# Patient Record
Sex: MALE | NOT HISPANIC OR LATINO | URBAN - METROPOLITAN AREA
[De-identification: names, ages, dates, MRNs, and addresses within clinical notes are randomized per-mention and may not be internally consistent; named-entity substitution may affect disease eponyms.]

---

## 2024-05-23 ENCOUNTER — TELEMEDICINE (OUTPATIENT)
Dept: CARDIOLOGY | Facility: HOSPITAL | Age: 64
End: 2024-05-23

## 2024-05-23 DIAGNOSIS — I49.3 PVC (PREMATURE VENTRICULAR CONTRACTION): Primary | ICD-10-CM

## 2024-05-23 DIAGNOSIS — I47.29 NSVT (NONSUSTAINED VENTRICULAR TACHYCARDIA) (MULTI): ICD-10-CM

## 2024-05-23 PROBLEM — I10 ESSENTIAL HYPERTENSION: Status: ACTIVE | Noted: 2024-05-23

## 2024-05-23 PROCEDURE — 99204 OFFICE O/P NEW MOD 45 MIN: CPT | Performed by: INTERNAL MEDICINE

## 2024-05-23 RX ORDER — AMIODARONE HYDROCHLORIDE 200 MG/1
200 TABLET ORAL DAILY
COMMUNITY

## 2024-05-23 RX ORDER — AMLODIPINE AND VALSARTAN 10; 160 MG/1; MG/1
1 TABLET ORAL DAILY
COMMUNITY

## 2024-05-23 NOTE — PROGRESS NOTES
Current Outpatient Medications   Medication Instructions    amiodarone (PACERONE) 200 mg, oral, Daily    amlodipine-valsartan (Exforge)  mg tablet 1 tablet, oral, Daily      Subjective   Anders Cho is a 63 y.o. male.    Chief Complaint:  Ventricular tachycardia and PVC's    HPI    Patient presents for a virtual visit today with frequent PVC's and NSVT and previous RVOT ablation to discuss further treatment.     PMH includes: HTN, PVC's s/p RVOT ablation, h/o PE.    His symptoms include palpitations. He has previously been treated with sotalol and is currently managed with amiodarone.     Patient has had a history of non-sustained ventricular tachycardia since at least 2011. for an EPS and RVOT VT ablation (noted to be originating from the low RVOT) in [place], [place] in 2012, During a stay in [place], he underwent MRI and MRA scans, which showed normal coronary arteries, no signs of arrhythmogenic right ventricular dysplasia, and a normal left ventricular function with an ejection fraction of 61%. A Holter monitor test performed off Sotalol revealed no arrhythmias.  After returning to [facility], he experienced no palpitations for eight months, but then they resumed.   Subsequent Holter monitoring identified frequent PVC's and ventricular tachycardia, leading to a restart of Sotalol at 120 mg twice daily.     He was again seen by an EP in October 2022, during which time he had been free of palpitations and was not on antiarrhythmic medications. He had undergone two back surgeries recently and developed two pulmonary emboli, requiring treatment with Eliquis 5 mg twice daily. He had also started experiencing shortness of breath on exertion. An echocardiogram at that time showed normal biventricular size and function with no evidence of pulmonary hypertension.    Recently, he reported chest discomfort and palpitations, leading to a referral for further evaluation.   Echocardiography indicated normal biventricular  size and function, but a mildly elevated right ventricular systolic pressure of 42 mm Hg.   A 24-hour Holter revealed sinus rhythm with occasional premature atrial contractions, moderate numbers of premature ventricular contractions, a single ventricular couplet, and one short run of non-sustained ventricular tachycardia. Electrolyte levels and thyroid function tests returned normal results.  He was started on amiodarone with satisfactory results but he does not want to continue taking it long term due to its toxicity profile.  He is seeking a repeat catheter ablation for his PVCs and NSVTs.      TESTING    - 09/2023 24 Hour Holter:   CONCLUSIONS:  Sinus rhythm with occasional premature atrial contractions and occasional premature ventricular contractions with one ventricular couplet and one tripler and a single short run of ventricular tachycardia. There were no runs of supraventricular tachycardia, and no significant pauses were seen. The maximum heart rate was 170/minute at 19:03 HRS with a minimum heart rate of 57/minute at 04:46 HRS and an average heart rate of 72/minute.    07/2023 HOLTER:   CONCLUSIONS:  Sinus thythm with occasional premature atrial contractions and occasional premature ventricular contractions with one ventricular couplet and one triplet and a single short run of ventricular tachycardia. There were no runs of supraventricular tachycardia, and no signiticant pauses were seen. The meximum heart rate was 170/minute at 19:03 HRS with a minimum heart rate of 57/minute at 04:46 HRS and an average heart rate of 72/minute.    -ECHO 09/20/23 : LVEF 77%   Impression:  Mildly thickened non-coronary cusp of aortic valve with trivial aortic regurgitation.  Structurally normal mitral, pulmonary, and tricuspid valves with mild mitral, pulmonary and tricuspid regurgitation.  Normal biatrial size and function.  Normal biventricular size and function.  Mildly elevated pulmonary artery pressures.    -  "Treadmill Stress test 2012: \"Good exercise tolerance with no evidence for ischemia\"    - ECG 04/18/2011: Sinus perry at 53bpm, SC interval 88ms, otherwise normal      Review of Systems   Constitutional: Negative.   HENT: Negative.     Eyes: Negative.    Cardiovascular:  Positive for irregular heartbeat and palpitations.   Respiratory: Negative.     Endocrine: Negative.    Skin: Negative.    Musculoskeletal: Negative.    Gastrointestinal: Negative.    Genitourinary: Negative.    Neurological: Negative.    Psychiatric/Behavioral: Negative.         Assessment/Plan     WE DISCUSSED THE RISKS AND BENEFITS OF AADs vs. CATHETER ABLATION FOR PVCs / NSVT IN THE SETTINGS OF STRUCTURAL NORMAL HEART AND LV FUNCTION. PMHx OF CATHETER ABLATION FOR RVOT PVCs.   HE UNDERSTANDS AND WANTS TO PROCEED WITH EPS AND ABLATION.      SCHEDULE AF ABLATION UNDER CONSCIOUS SEDATION  HOLD ALL MEDS IN THE MORNING OF PROCEDURE  CARTO SYSTEM - STEREO LAB    PT WILL CHECK WITH HIS LOCAL CARDIOLOGIST IF THEY ARE ABLE TO OBTAIN A CARDIAC MRI IN \[facility]\"".  ** ONCE WE HAVE A DATE FOR THE ABLATION PT NEEDS TO DISCONTINUE AMIODARONE 3-4 WEEKS PRIOR THE PROCEDURE AND REPEAT A HOLTER IN \[facility]\"" TO DOCUMENT THE PVCs ARE STILL PRESENT.      MD Chon Mederos Master Clinician of Cardiovascular [place].   [facility] [place].   Director of Electrophysiology Center  Professor of Medicine.   [employer].   "

## 2024-05-28 ASSESSMENT — ENCOUNTER SYMPTOMS
ENDOCRINE NEGATIVE: 1
IRREGULAR HEARTBEAT: 1
PSYCHIATRIC NEGATIVE: 1
MUSCULOSKELETAL NEGATIVE: 1
PALPITATIONS: 1
GASTROINTESTINAL NEGATIVE: 1
CONSTITUTIONAL NEGATIVE: 1
RESPIRATORY NEGATIVE: 1
EYES NEGATIVE: 1
NEUROLOGICAL NEGATIVE: 1

## 2024-09-11 ENCOUNTER — PHARMACY VISIT (OUTPATIENT)
Dept: PHARMACY | Facility: CLINIC | Age: 64
End: 2024-09-11
Payer: COMMERCIAL

## 2024-09-11 ENCOUNTER — OFFICE VISIT (OUTPATIENT)
Dept: CARDIOLOGY | Facility: CLINIC | Age: 64
End: 2024-09-11
Payer: COMMERCIAL

## 2024-09-11 VITALS
HEIGHT: 73 IN | OXYGEN SATURATION: 98 % | DIASTOLIC BLOOD PRESSURE: 97 MMHG | SYSTOLIC BLOOD PRESSURE: 163 MMHG | BODY MASS INDEX: 29.65 KG/M2 | HEART RATE: 56 BPM | RESPIRATION RATE: 18 BRPM | WEIGHT: 223.7 LBS

## 2024-09-11 DIAGNOSIS — Z86.711 HISTORY OF PULMONARY EMBOLISM: ICD-10-CM

## 2024-09-11 DIAGNOSIS — I49.3 PVC (PREMATURE VENTRICULAR CONTRACTION): ICD-10-CM

## 2024-09-11 DIAGNOSIS — I47.29 NSVT (NONSUSTAINED VENTRICULAR TACHYCARDIA) (MULTI): ICD-10-CM

## 2024-09-11 DIAGNOSIS — I10 ESSENTIAL HYPERTENSION: ICD-10-CM

## 2024-09-11 DIAGNOSIS — Z01.818 PRE-OPERATIVE CLEARANCE: Primary | ICD-10-CM

## 2024-09-11 PROCEDURE — RXMED WILLOW AMBULATORY MEDICATION CHARGE

## 2024-09-11 PROCEDURE — 93005 ELECTROCARDIOGRAM TRACING: CPT | Performed by: INTERNAL MEDICINE

## 2024-09-11 PROCEDURE — 3080F DIAST BP >= 90 MM HG: CPT | Performed by: INTERNAL MEDICINE

## 2024-09-11 PROCEDURE — RXOTC WILLOW AMBULATORY OTC CHARGE

## 2024-09-11 PROCEDURE — 3008F BODY MASS INDEX DOCD: CPT | Performed by: INTERNAL MEDICINE

## 2024-09-11 PROCEDURE — 3077F SYST BP >= 140 MM HG: CPT | Performed by: INTERNAL MEDICINE

## 2024-09-11 PROCEDURE — 1126F AMNT PAIN NOTED NONE PRSNT: CPT | Performed by: INTERNAL MEDICINE

## 2024-09-11 PROCEDURE — 99214 OFFICE O/P EST MOD 30 MIN: CPT | Performed by: INTERNAL MEDICINE

## 2024-09-11 PROCEDURE — 1159F MED LIST DOCD IN RCRD: CPT | Performed by: INTERNAL MEDICINE

## 2024-09-11 RX ORDER — AMINOPHYLLINE 25 MG/ML
125 INJECTION, SOLUTION INTRAVENOUS ONCE AS NEEDED
Status: CANCELLED | OUTPATIENT
Start: 2024-09-11

## 2024-09-11 RX ORDER — SPIRONOLACTONE 25 MG/1
12.5 TABLET ORAL DAILY
Qty: 60 TABLET | Refills: 0 | Status: SHIPPED | OUTPATIENT
Start: 2024-09-11 | End: 2025-09-11

## 2024-09-11 RX ORDER — SPIRONOLACTONE 25 MG/1
12.5 TABLET ORAL DAILY
Qty: 60 TABLET | Refills: 0 | Status: SHIPPED | OUTPATIENT
Start: 2024-09-11 | End: 2024-09-11

## 2024-09-11 RX ORDER — REGADENOSON 0.08 MG/ML
0.4 INJECTION, SOLUTION INTRAVENOUS
Status: CANCELLED | OUTPATIENT
Start: 2024-09-11

## 2024-09-11 ASSESSMENT — COLUMBIA-SUICIDE SEVERITY RATING SCALE - C-SSRS
6. HAVE YOU EVER DONE ANYTHING, STARTED TO DO ANYTHING, OR PREPARED TO DO ANYTHING TO END YOUR LIFE?: NO
2. HAVE YOU ACTUALLY HAD ANY THOUGHTS OF KILLING YOURSELF?: NO
1. IN THE PAST MONTH, HAVE YOU WISHED YOU WERE DEAD OR WISHED YOU COULD GO TO SLEEP AND NOT WAKE UP?: NO

## 2024-09-11 ASSESSMENT — PATIENT HEALTH QUESTIONNAIRE - PHQ9
2. FEELING DOWN, DEPRESSED OR HOPELESS: NOT AT ALL
1. LITTLE INTEREST OR PLEASURE IN DOING THINGS: NOT AT ALL
SUM OF ALL RESPONSES TO PHQ9 QUESTIONS 1 AND 2: 0

## 2024-09-11 ASSESSMENT — ENCOUNTER SYMPTOMS
DEPRESSION: 0
OCCASIONAL FEELINGS OF UNSTEADINESS: 0
LOSS OF SENSATION IN FEET: 0

## 2024-09-11 ASSESSMENT — PAIN SCALES - GENERAL: PAINLEVEL: 0-NO PAIN

## 2024-09-11 NOTE — PROGRESS NOTES
Subjective   Anders Cho is a 64 y.o. male.    Past medical history  Hypertension is on amlodipine/valsartan combination  Pulmonary embolism  RVOT ablation due to PVC possibly in 2011 at [place]  Patient had back surgeries in the past and connection to that developed pulmonary embolism.  Was on a Eliquis therapy follow-up.    Social history: Patient lives in [facility].  Currently traveling here for medical treatment    Chief Complaint:  No chief complaint on file.  Patient has been seen in the past by Dr. Messer who recommended getting a cardiac MRI.  Patient saw his electrophysiologist mainly because of symptomatic PVC and a nonsustained VT.  Patient is currently on amiodarone and did not want to continue that because of the toxicity risk on long-term use.  Patient reach out to Dr. Soares for further treatment, possible ablation who advised him to get a cardiac MRI.  Patient is originally from  [facility] and was advised to get cardiac MRI locally.  In the past he had MRI prior to ablation in 2011 and recently had an echo somewhere and apparently both were normal.  We do not have any records in the system.      HPI  Getting a cervical decompression surgery on Monday next week.  Is here for preop clearance    Denies all cardiovascular complaints like palpitation, shortness of breath, lower extremity edema or chest pain    Physically not very active      ROS  No major symptoms    Objective   Constitutional:       Appearance: Not in distress.   Neck:      Vascular: JVD normal.   Pulmonary:      Effort: Pulmonary effort is normal.      Breath sounds: Normal breath sounds.   Cardiovascular:      PMI at left midclavicular line. Normal rate. Regular rhythm. Normal S1. Normal S2.       Murmurs: There is no murmur.   Abdominal:      General: Bowel sounds are normal.      Palpations: Abdomen is soft.   Skin:     General: Skin is warm and dry.   Neurological:      General: No focal deficit present.      Mental Status: Alert  "and oriented to person, place and time.       EKG: Sinus rhythm, otherwise normal      Lab Review:   No results found for: \"NA\", \"K\", \"CL\", \"CO2\", \"BUN\", \"CREATININE\", \"GLUCOSE\", \"CALCIUM\"  No results found for: \"WBC\", \"HGB\", \"HCT\", \"MCV\", \"PLT\"  No results found for: \"CHOL\", \"TRIG\", \"HDL\", \"LDLDIRECT\"    Assessment/Plan   The encounter diagnosis was Pre-operative clearance.  Known case of hypertension and a prior ablation for PVCs.  Patient also has history of pulmonary embolism in the past associated with back surgeries.  Patient is currently getting a cervical decompression surgery on Monday next week and is here for preop clearance  Echocardiogram and stress test is already ordered and pending  Denies all cardiovascular symptoms but physically not very active.  Based on history do not achieve 5 METS    Preop clearance:  -No alarming symptoms  -Compliant with his medication  -Blood pressure is high and optimizing management  -Pending echo and stress test      PVC and nonsustained VT:  Currently asymptomatic  -Compliant with amiodarone  -Follows up with EP       Hypertension:  On amlodipine and valsartan combination  -Blood pressure is in 160s  -Drop in the blood pressure at home but mentions that during the last visit blood pressure was high  -Advised to take 12.5 mg spironolactone  -Had a blood work done in his home country which shows normal creatinine and potassium  -Patient advised to measure his blood pressure regularly at home as it may defer to surgery if his blood pressure is too high      Most likely no future follow-ups as patient does not live here  Patient is advised to reach out to me for paperwork for clearance    Addendum for preop clearance  Normal Stress Test and Echo, low risk for surgery  "

## 2024-09-12 ENCOUNTER — HOSPITAL ENCOUNTER (OUTPATIENT)
Dept: CARDIOLOGY | Facility: CLINIC | Age: 64
Discharge: HOME | End: 2024-09-12
Payer: COMMERCIAL

## 2024-09-12 VITALS
WEIGHT: 223 LBS | BODY MASS INDEX: 29.55 KG/M2 | HEIGHT: 73 IN | SYSTOLIC BLOOD PRESSURE: 150 MMHG | DIASTOLIC BLOOD PRESSURE: 82 MMHG

## 2024-09-12 DIAGNOSIS — Z86.711 HISTORY OF PULMONARY EMBOLISM: ICD-10-CM

## 2024-09-12 DIAGNOSIS — I47.29 NSVT (NONSUSTAINED VENTRICULAR TACHYCARDIA) (MULTI): ICD-10-CM

## 2024-09-12 DIAGNOSIS — Z01.818 ENCOUNTER FOR OTHER PREPROCEDURAL EXAMINATION: ICD-10-CM

## 2024-09-12 LAB
AORTIC VALVE PEAK VELOCITY: 1.58 M/S
ATRIAL RATE: 56 BPM
AV PEAK GRADIENT: 10 MMHG
AVA (PEAK VEL): 3.42 CM2
EJECTION FRACTION APICAL 4 CHAMBER: 61.2
EJECTION FRACTION: 59 %
LEFT ATRIUM VOLUME AREA LENGTH INDEX BSA: 23.5 ML/M2
LEFT VENTRICLE INTERNAL DIMENSION DIASTOLE: 5.15 CM (ref 3.5–6)
LEFT VENTRICULAR OUTFLOW TRACT DIAMETER: 2.48 CM
MITRAL VALVE E/A RATIO: 1.09
P AXIS: 55 DEGREES
P OFFSET: 203 MS
P ONSET: 140 MS
PR INTERVAL: 160 MS
Q ONSET: 220 MS
QRS COUNT: 10 BEATS
QRS DURATION: 84 MS
QT INTERVAL: 432 MS
QTC CALCULATION(BAZETT): 416 MS
QTC FREDERICIA: 422 MS
R AXIS: 48 DEGREES
RIGHT VENTRICLE FREE WALL PEAK S': 14.65 CM/S
T AXIS: 40 DEGREES
T OFFSET: 436 MS
TRICUSPID ANNULAR PLANE SYSTOLIC EXCURSION: 0.9 CM
VENTRICULAR RATE: 56 BPM

## 2024-09-12 PROCEDURE — 93306 TTE W/DOPPLER COMPLETE: CPT

## 2024-09-12 PROCEDURE — 93306 TTE W/DOPPLER COMPLETE: CPT | Performed by: INTERNAL MEDICINE

## 2024-09-13 ENCOUNTER — HOSPITAL ENCOUNTER (OUTPATIENT)
Dept: RADIOLOGY | Facility: HOSPITAL | Age: 64
Discharge: HOME | End: 2024-09-13
Payer: COMMERCIAL

## 2024-09-13 ENCOUNTER — TELEPHONE (OUTPATIENT)
Dept: CARDIOLOGY | Facility: HOSPITAL | Age: 64
End: 2024-09-13

## 2024-09-13 ENCOUNTER — HOSPITAL ENCOUNTER (OUTPATIENT)
Dept: CARDIOLOGY | Facility: HOSPITAL | Age: 64
Discharge: HOME | End: 2024-09-13
Payer: COMMERCIAL

## 2024-09-13 DIAGNOSIS — Z01.818 PRE-OPERATIVE CLEARANCE: ICD-10-CM

## 2024-09-13 PROCEDURE — A9502 TC99M TETROFOSMIN: HCPCS | Performed by: INTERNAL MEDICINE

## 2024-09-13 PROCEDURE — 93018 CV STRESS TEST I&R ONLY: CPT | Performed by: INTERNAL MEDICINE

## 2024-09-13 PROCEDURE — 3430000001 HC RX 343 DIAGNOSTIC RADIOPHARMACEUTICALS: Performed by: INTERNAL MEDICINE

## 2024-09-13 PROCEDURE — 93017 CV STRESS TEST TRACING ONLY: CPT

## 2024-09-13 PROCEDURE — 93016 CV STRESS TEST SUPVJ ONLY: CPT | Performed by: INTERNAL MEDICINE

## 2024-09-13 PROCEDURE — 78452 HT MUSCLE IMAGE SPECT MULT: CPT

## 2024-09-13 PROCEDURE — 2500000004 HC RX 250 GENERAL PHARMACY W/ HCPCS (ALT 636 FOR OP/ED): Performed by: INTERNAL MEDICINE

## 2024-09-13 RX ORDER — REGADENOSON 0.08 MG/ML
0.4 INJECTION, SOLUTION INTRAVENOUS
Status: COMPLETED | OUTPATIENT
Start: 2024-09-13 | End: 2024-09-13

## 2024-09-13 NOTE — PROGRESS NOTES
Did not intend to write a note.  It was just an addendum to the clinic visit that patient is low risk for surgery  Randolph Martin MD

## 2024-09-19 ENCOUNTER — HOSPITAL ENCOUNTER (OUTPATIENT)
Dept: RADIOLOGY | Facility: HOSPITAL | Age: 64
Discharge: HOME | End: 2024-09-19
Payer: COMMERCIAL

## 2024-09-19 VITALS — HEIGHT: 73 IN | BODY MASS INDEX: 29.51 KG/M2 | WEIGHT: 222.66 LBS

## 2024-09-19 DIAGNOSIS — Z01.818 PRE-OPERATIVE CLEARANCE: ICD-10-CM

## 2024-09-19 DIAGNOSIS — Z86.711 HISTORY OF PULMONARY EMBOLISM: ICD-10-CM

## 2024-09-19 DIAGNOSIS — Z01.818 PRE-OPERATIVE CLEARANCE: Primary | ICD-10-CM

## 2024-09-19 DIAGNOSIS — I47.29 NSVT (NONSUSTAINED VENTRICULAR TACHYCARDIA) (MULTI): ICD-10-CM

## 2024-09-19 LAB
ATRIAL RATE: 56 BPM
P AXIS: 55 DEGREES
P OFFSET: 203 MS
P ONSET: 140 MS
PR INTERVAL: 160 MS
Q ONSET: 220 MS
QRS COUNT: 10 BEATS
QRS DURATION: 84 MS
QT INTERVAL: 432 MS
QTC CALCULATION(BAZETT): 416 MS
QTC FREDERICIA: 422 MS
R AXIS: 48 DEGREES
T AXIS: 40 DEGREES
T OFFSET: 436 MS
VENTRICULAR RATE: 56 BPM

## 2024-09-19 PROCEDURE — A9575 INJ GADOTERATE MEGLUMI 0.1ML: HCPCS | Performed by: INTERNAL MEDICINE

## 2024-09-19 PROCEDURE — 2550000001 HC RX 255 CONTRASTS: Performed by: INTERNAL MEDICINE

## 2024-09-19 PROCEDURE — 75561 CARDIAC MRI FOR MORPH W/DYE: CPT

## 2024-09-19 RX ORDER — GADOTERATE MEGLUMINE 376.9 MG/ML
40 INJECTION INTRAVENOUS
Status: COMPLETED | OUTPATIENT
Start: 2024-09-19 | End: 2024-09-19